# Patient Record
Sex: MALE | Race: BLACK OR AFRICAN AMERICAN | Employment: FULL TIME | URBAN - METROPOLITAN AREA
[De-identification: names, ages, dates, MRNs, and addresses within clinical notes are randomized per-mention and may not be internally consistent; named-entity substitution may affect disease eponyms.]

---

## 2020-10-28 ENCOUNTER — HOSPITAL ENCOUNTER (EMERGENCY)
Age: 45
Discharge: HOME | End: 2020-10-28
Attending: EMERGENCY MEDICINE
Payer: COMMERCIAL

## 2020-10-28 VITALS
SYSTOLIC BLOOD PRESSURE: 152 MMHG | HEART RATE: 62 BPM | WEIGHT: 217.59 LBS | HEIGHT: 71 IN | RESPIRATION RATE: 20 BRPM | BODY MASS INDEX: 30.46 KG/M2 | OXYGEN SATURATION: 100 % | TEMPERATURE: 98.2 F | DIASTOLIC BLOOD PRESSURE: 95 MMHG

## 2020-10-28 DIAGNOSIS — S61.012A LACERATION OF LEFT THUMB WITHOUT FOREIGN BODY WITHOUT DAMAGE TO NAIL, INITIAL ENCOUNTER: Primary | ICD-10-CM

## 2020-10-28 PROCEDURE — 90715 TDAP VACCINE 7 YRS/> IM: CPT | Performed by: PHYSICIAN ASSISTANT

## 2020-10-28 PROCEDURE — 74011000250 HC RX REV CODE- 250: Performed by: PHYSICIAN ASSISTANT

## 2020-10-28 PROCEDURE — 90471 IMMUNIZATION ADMIN: CPT

## 2020-10-28 PROCEDURE — 74011250636 HC RX REV CODE- 250/636: Performed by: PHYSICIAN ASSISTANT

## 2020-10-28 PROCEDURE — 99281 EMR DPT VST MAYX REQ PHY/QHP: CPT

## 2020-10-28 PROCEDURE — 75810000293 HC SIMP/SUPERF WND  RPR

## 2020-10-28 RX ORDER — LIDOCAINE HYDROCHLORIDE 10 MG/ML
5 INJECTION, SOLUTION EPIDURAL; INFILTRATION; INTRACAUDAL; PERINEURAL ONCE
Status: COMPLETED | OUTPATIENT
Start: 2020-10-28 | End: 2020-10-28

## 2020-10-28 RX ORDER — BUPIVACAINE HYDROCHLORIDE 5 MG/ML
5 INJECTION, SOLUTION EPIDURAL; INTRACAUDAL
Status: COMPLETED | OUTPATIENT
Start: 2020-10-28 | End: 2020-10-28

## 2020-10-28 RX ADMIN — BUPIVACAINE HYDROCHLORIDE 25 MG: 5 INJECTION, SOLUTION EPIDURAL; INTRACAUDAL at 20:04

## 2020-10-28 RX ADMIN — LIDOCAINE HYDROCHLORIDE 5 ML: 10 INJECTION, SOLUTION EPIDURAL; INFILTRATION; INTRACAUDAL; PERINEURAL at 20:04

## 2020-10-28 RX ADMIN — TETANUS TOXOID, REDUCED DIPHTHERIA TOXOID AND ACELLULAR PERTUSSIS VACCINE, ADSORBED 0.5 ML: 5; 2.5; 8; 8; 2.5 SUSPENSION INTRAMUSCULAR at 19:59

## 2020-10-28 NOTE — LETTER
[address] 
[facility] EMERGENCY DEPT 
[address] [place] [address] Joesph Carbajal [id number] 
[phone] Work/School Note Date: 10/28/2020 To Whom It May concern: 
 
Carley Landon was seen and treated today in the emergency room by the following provider(s): 
Attending Provider: Bette Sheldon MD 
Physician Assistant: Lucas Homans, PA.   
 
Carley Landon may return to work on 10/30/2020. Sincerely, Mendez Mcduffie PA-C

## 2020-10-28 NOTE — DISCHARGE INSTRUCTIONS
Patient Education        Cuts on the Hand Closed With Stitches: Care Instructions  Your Care Instructions     A cut on your hand can be on your fingers, your thumb, or the front or back of your hand. Sometimes a cut can injure the tendons, blood vessels, or nerves of your hand. The doctor used stitches to close the cut. Using stitches also helps the cut heal and reduces scarring. The doctor may have given you a splint to help prevent you from moving your hand, fingers, or thumb. If the cut went deep and through the skin, the doctor put in two layers of stitches. The deeper layer brings the deep part of the cut together. These stitches will dissolve and don't need to be removed. The stitches in the upper layer are the ones you see on the cut. You will probably have a bandage. You will need to have the stitches removed, usually in 7 to 14 days. The doctor may suggest that you see a hand specialist if the cut is very deep or if you have trouble moving your fingers or have less feeling in your hand. The doctor has checked you carefully, but problems can develop later. If you notice any problems or new symptoms, get medical treatment right away. Follow-up care is a key part of your treatment and safety. Be sure to make and go to all appointments, and call your doctor if you are having problems. It's also a good idea to know your test results and keep a list of the medicines you take. How can you care for yourself at home? · Keep the cut dry for the first 24 to 48 hours. After this, you can shower if your doctor okays it. Pat the cut dry. · Don't soak the cut, such as in a bathtub. Your doctor will tell you when it's safe to get the cut wet. · If your doctor told you how to care for your cut, follow your doctor's instructions. If you did not get instructions, follow this general advice:  ? After the first 24 to 48 hours, wash around the cut with clean water 2 times a day.  Don't use hydrogen peroxide or alcohol, which can slow healing. ? You may cover the cut with a thin layer of petroleum jelly, such as Vaseline, and a nonstick bandage. ? Apply more petroleum jelly and replace the bandage as needed. · Prop up the sore hand on a pillow anytime you sit or lie down during the next 3 days. Try to keep it above the level of your heart. This will help reduce swelling. · Avoid any activity that could cause your cut to reopen. · Do not remove the stitches on your own. Your doctor will tell you when to come back to have the stitches removed. · Be safe with medicines. Take pain medicines exactly as directed. ? If the doctor gave you a prescription medicine for pain, take it as prescribed. ? If you are not taking a prescription pain medicine, ask your doctor if you can take an over-the-counter medicine. When should you call for help? Call your doctor now or seek immediate medical care if:    · You have new pain, or your pain gets worse.     · The skin near the cut is cold or pale or changes color.     · You have tingling, weakness, or numbness near the cut.     · The cut starts to bleed, and blood soaks through the bandage. Oozing small amounts of blood is normal.     · You have trouble moving the area of the hand near the cut.     · You have symptoms of infection, such as:  ? Increased pain, swelling, warmth, or redness around the cut.  ? Red streaks leading from the cut.  ? Pus draining from the cut.  ? A fever. Watch closely for changes in your health, and be sure to contact your doctor if:    · You do not get better as expected. Where can you learn more? Go to http://www.gray.com/  Enter T250 in the search box to learn more about \"Cuts on the Hand Closed With Stitches: Care Instructions. \"  Current as of: June 26, 2019               Content Version: 12.6  © 4220-2972 [employer].    Care instructions adapted under license by [employer] (which disclaims liability or warranty for this information). If you have questions about a medical condition or this instruction, always ask your healthcare professional. [place] [address] any warranty or liability for your use of this information.

## 2020-10-28 NOTE — ED PROVIDER NOTES
EMERGENCY DEPARTMENT HISTORY AND PHYSICAL EXAM      Date: 10/28/2020  Patient Name: Bella Barth    History of Presenting Illness     Chief Complaint   Patient presents with    Laceration     left thumb       History Provided By: Patient    HPI: Bella Barth, 39 y.o. male with no pertinent past medical history, presents to the ED with cc of left thumb laceration onset 1 hour prior to arrival.  The patient was trying to break apart turkey burgers when he accidentally sliced his thumb. He sustained a 1.5 cm laceration over the side of the thumb. He reports heavy bleeding that has been improved with direct pressure to the area. He denies numbness. Tetanus is not up-to-date. There are no other complaints, changes, or physical findings at this time. PCP: Unknown, Provider    No current facility-administered medications on file prior to encounter. Current Outpatient Medications on File Prior to Encounter   Medication Sig Dispense Refill    [DISCONTINUED] HYDROcodone-acetaminophen (NORCO) 5-325 mg per tablet Take 1 Tab by mouth every four (4) hours as needed for Pain. Max Daily Amount: 6 Tabs. 10 Tab 0    [DISCONTINUED] cyclobenzaprine (FLEXERIL) 10 mg tablet Take 1 Tab by mouth three (3) times daily as needed for Muscle Spasm(s). 20 Tab 0       Past History     Past Medical History:  No past medical history on file. Past Surgical History:  No past surgical history on file. Family History:  No family history on file. Social History:  Social History     Tobacco Use    Smoking status: Never Smoker   Substance Use Topics    Alcohol use: No    Drug use: Not on file       Allergies:  No Known Allergies      Review of Systems   Review of Systems   Constitutional: Negative for chills and fever. HENT: Negative for ear pain and sore throat. Eyes: Negative for redness and visual disturbance. Respiratory: Negative for cough and shortness of breath.     Cardiovascular: Negative for chest pain and palpitations. Gastrointestinal: Negative for abdominal pain, nausea and vomiting. Genitourinary: Negative for dysuria and hematuria. Musculoskeletal: Negative for back pain and gait problem. Skin: Positive for wound. Negative for rash. Neurological: Negative for dizziness and headaches. Psychiatric/Behavioral: Negative for behavioral problems and confusion. All other systems reviewed and are negative. Physical Exam   Physical Exam  Constitutional:       Appearance: He is not toxic-appearing. Comments: Conversant male in no acute distress. HENT:      Head: Normocephalic and atraumatic. Mouth/Throat:      Mouth: Mucous membranes are moist.   Eyes:      Extraocular Movements: Extraocular movements intact. Pupils: Pupils are equal, round, and reactive to light. Neck:      Musculoskeletal: Normal range of motion and neck supple. Cardiovascular:      Rate and Rhythm: Normal rate and regular rhythm. Pulmonary:      Effort: Pulmonary effort is normal. No respiratory distress. Musculoskeletal: Normal range of motion. General: No deformity. Skin:     General: Skin is warm and dry. Comments: 1.5 cm linear laceration to the medial thumb. No evidence of tendon, bony, or nerve injury. Neurological:      General: No focal deficit present. Mental Status: He is alert and oriented to person, place, and time. Psychiatric:         Behavior: Behavior normal.             Diagnostic Study Results     Labs -   No results found for this or any previous visit (from the past 12 hour(s)). Radiologic Studies -   No orders to display     CT Results  (Last 48 hours)    None        CXR Results  (Last 48 hours)    None            Medical Decision Making   I am the first provider for this patient. I reviewed the vital signs, available nursing notes, past medical history, past surgical history, family history and social history.     Vital Signs-Reviewed the patient's vital signs. Patient Vitals for the past 12 hrs:   Temp Pulse Resp BP SpO2   10/28/20 1727 98.2 °F (36.8 °C) 62 20 (!) 152/95 100 %         Records Reviewed: Nursing Notes and Old Medical Records      Provider Notes (Medical Decision Making):   Patient presents with uncomplicated finger laceration. Plan for primary wound closure and tetanus update. ED Course:   Initial assessment performed. The patients presenting problems have been discussed, and they are in agreement with the care plan formulated and outlined with them. I have encouraged them to ask questions as they arise throughout their visit. Wound Repair    Date/Time: 10/28/2020 8:16 PM  Performed by: PAPreparation: skin prepped with ChloraPrep  Pre-procedure re-eval: Immediately prior to the procedure, the patient was reevaluated and found suitable for the planned procedure and any planned medications. Location details: left thumb  Wound length:2.5 cm or less  Anesthesia: digital block    Anesthesia:  Local Anesthetic: lidocaine 1% without epinephrine and bupivacaine 0.5% without epinephrine  Anesthetic total: 3 mL  Foreign bodies: no foreign bodies  Irrigation solution: saline  Irrigation method: syringe  Skin closure: 5-0 nylon  Number of sutures: 3  Technique: simple  Approximation: close  Dressing: gauze roll  Patient tolerance: Patient tolerated the procedure well with no immediate complications  My total time at bedside, performing this procedure was 16-30 minutes. Disposition:  8:16 PM    The patient has been re-evaluated and is ready for discharge. Reviewed available results with patient. Counseled patient on diagnosis and care plan. Patient has expressed understanding, and all questions have been answered. Patient agrees with plan and agrees to follow up as recommended, or to return to the ED if their symptoms worsen.  Discharge instructions have been provided and explained to the patient, along with reasons to return to the ED.      PLAN:  1. Current Discharge Medication List      STOP taking these medications       HYDROcodone-acetaminophen (NORCO) 5-325 mg per tablet Comments:   Reason for Stopping:         cyclobenzaprine (FLEXERIL) 10 mg tablet Comments:   Reason for Stoppin.   Follow-up Information     Follow up With Specialties Details Why Contact Info    [facility] EMERGENCY DEPT Emergency Medicine Go to in 10-14 days for stitch removal, sooner for signs of infection [address] [place] [address]  [phone]        Return to ED if worse     Diagnosis     Clinical Impression:   1. Laceration of left thumb without foreign body without damage to nail, initial encounter            Chavez Solo.  TARYN León

## 2020-10-29 NOTE — ED NOTES
I have reviewed discharge instructions with the patient. The patient verbalized understanding and signed.

## 2021-03-24 ENCOUNTER — HOSPITAL ENCOUNTER (EMERGENCY)
Age: 46
Discharge: HOME | End: 2021-03-25
Attending: EMERGENCY MEDICINE
Payer: COMMERCIAL

## 2021-03-24 VITALS
SYSTOLIC BLOOD PRESSURE: 131 MMHG | TEMPERATURE: 98.4 F | RESPIRATION RATE: 18 BRPM | OXYGEN SATURATION: 98 % | WEIGHT: 203.93 LBS | BODY MASS INDEX: 28.55 KG/M2 | HEART RATE: 68 BPM | DIASTOLIC BLOOD PRESSURE: 82 MMHG | HEIGHT: 71 IN

## 2021-03-24 DIAGNOSIS — M54.6 ACUTE RIGHT-SIDED THORACIC BACK PAIN: ICD-10-CM

## 2021-03-24 DIAGNOSIS — R07.89 MUSCULOSKELETAL CHEST PAIN: Primary | ICD-10-CM

## 2021-03-24 PROCEDURE — 99283 EMERGENCY DEPT VISIT LOW MDM: CPT

## 2021-03-25 LAB
ATRIAL RATE: 62 BPM
CALCULATED P AXIS, ECG09: 39 DEGREES
CALCULATED R AXIS, ECG10: 35 DEGREES
CALCULATED T AXIS, ECG11: 44 DEGREES
DIAGNOSIS, 93000: NORMAL
P-R INTERVAL, ECG05: 154 MS
Q-T INTERVAL, ECG07: 372 MS
QRS DURATION, ECG06: 88 MS
QTC CALCULATION (BEZET), ECG08: 377 MS
VENTRICULAR RATE, ECG03: 62 BPM

## 2021-03-25 PROCEDURE — 93005 ELECTROCARDIOGRAM TRACING: CPT

## 2021-03-25 RX ORDER — NAPROXEN 500 MG/1
500 TABLET ORAL
Qty: 20 TAB | Refills: 0 | Status: SHIPPED | OUTPATIENT
Start: 2021-03-25

## 2021-03-25 RX ORDER — NAPROXEN 250 MG/1
500 TABLET ORAL
Status: DISCONTINUED | OUTPATIENT
Start: 2021-03-25 | End: 2021-03-25 | Stop reason: HOSPADM

## 2021-03-25 NOTE — ED PROVIDER NOTES
EMERGENCY DEPARTMENT HISTORY AND PHYSICAL EXAM     ------------------------------------------------------------------------------------------------------  Please note that this dictation was completed with [employer] the [employer] voice recognition software. Quite often unanticipated grammatical, syntax, homophones, and other interpretive errors are inadvertently transcribed by the computer software. Please disregard these errors. Please excuse any errors that have escaped final proofreading.  -----------------------------------------------------------------------------------------------------------------    Date: 3/24/2021  Patient Name: Shannan Canales    History of Presenting Illness     Chief Complaint   Patient presents with    Muscle Problem     Pt arrives ambulatory to triage with CC of muscular pain around chest and R arm. He reports pain only occurs with movement of arms. Patient reports he works at [employer] at [employer]. History Provided By: Patient    HPI: Shannan Canales is a 39 y.o. male, without significant pmhx, who presents via private vehicle to the ED with c/o right anterior chest and right posterior subscapular pain that started earlier today. Patient reports that he works overnight at [employer] and lifting something heavier than he should have lifted on his own. Notes that his pain started earlier today with movement of his right arm exacerbating his sharp pain. Without other known injury or trauma. Did not take any medication at home to alleviate his symptoms. Pt also specifically denies any recent fevers, chills, CP, SOB, nausea, vomiting, diarrhea, abd pain, changes in BM, urinary sxs, or headache. PCP: Unknown, Provider    Social Hx: denies tobacco, denies EtOH, denies Illicit Drugs     There are no other complaints, changes, or physical findings at this time.      No Known Allergies          Past History     Past Medical History:  No past medical history on file. Past Surgical History:  No past surgical history on file. Family History:  No family history on file. Social History:  Social History     Tobacco Use    Smoking status: Never Smoker   Substance Use Topics    Alcohol use: No    Drug use: Not on file       Allergies:  No Known Allergies      Review of Systems   Review of Systems   Constitutional: Negative for chills and fever. HENT: Negative. Eyes: Negative. Respiratory: Negative for cough, chest tightness and shortness of breath. Cardiovascular: Negative for chest pain and leg swelling. Gastrointestinal: Negative for abdominal pain, diarrhea, nausea and vomiting. Endocrine: Negative. Genitourinary: Negative for difficulty urinating and dysuria. Musculoskeletal: Positive for myalgias. Skin: Negative. Neurological: Negative. Psychiatric/Behavioral: Negative. All other systems reviewed and are negative. Physical Exam   Physical Exam  Vitals signs and nursing note reviewed. Constitutional:       General: He is not in acute distress. Appearance: He is well-developed. He is not diaphoretic. HENT:      Head: Normocephalic and atraumatic. Nose: Nose normal.      Mouth/Throat:      Pharynx: No oropharyngeal exudate. Eyes:      Conjunctiva/sclera: Conjunctivae normal.      Pupils: Pupils are equal, round, and reactive to light. Neck:      Musculoskeletal: Normal range of motion and neck supple. Vascular: No JVD. Cardiovascular:      Rate and Rhythm: Normal rate and regular rhythm. Heart sounds: Normal heart sounds. No murmur. No friction rub. Pulmonary:      Effort: Pulmonary effort is normal. No respiratory distress. Breath sounds: Normal breath sounds. No stridor. No wheezing or rales. Abdominal:      General: Bowel sounds are normal. There is no distension. Palpations: Abdomen is soft. Tenderness: There is no abdominal tenderness. There is no rebound. Musculoskeletal: Normal range of motion. General: No tenderness. Right shoulder: He exhibits pain. He exhibits normal range of motion, no tenderness, no bony tenderness, no swelling, no deformity, no spasm, normal pulse and normal strength. Arms:    Skin:     General: Skin is warm and dry. Findings: No rash. Neurological:      Mental Status: He is alert and oriented to person, place, and time. Cranial Nerves: No cranial nerve deficit. Psychiatric:         Speech: Speech normal.         Behavior: Behavior normal.         Thought Content: Thought content normal.         Judgment: Judgment normal.           Diagnostic Study Results     Labs -     Recent Results (from the past 12 hour(s))   EKG, 12 LEAD, INITIAL    Collection Time: 03/25/21 12:24 AM   Result Value Ref Range    Ventricular Rate 62 BPM    Atrial Rate 62 BPM    P-R Interval 154 ms    QRS Duration 88 ms    Q-T Interval 372 ms    QTC Calculation (Bezet) 377 ms    Calculated P Axis 39 degrees    Calculated R Axis 35 degrees    Calculated T Axis 44 degrees    Diagnosis       Normal sinus rhythm  Normal ECG  No previous ECGs available         Radiologic Studies -   No orders to display     CT Results  (Last 48 hours)    None        CXR Results  (Last 48 hours)    None            Medical Decision Making   I am the first provider for this patient. I reviewed the vital signs, available nursing notes, past medical history, past surgical history, family history and social history. Vital Signs-Reviewed the patient's vital signs.   Patient Vitals for the past 12 hrs:   Temp Pulse Resp BP SpO2   03/24/21 2245 98.4 °F (36.9 °C) 68 18 131/82 98 %       Pulse Oximetry Analysis - 98% on RA normal    Records Reviewed/Interpretted: Nursing Notes from triage and Old Medical Records previous ER visit in October for laceration of his thumb    Provider Notes (Medical Decision Making):     DDX:  Musculoskeletal pain, arrhythmia, muscle spasm    Plan:  EKG, analgesic    Impression:  musculoskeletal pain    ED Course:   Initial assessment performed. The patients presenting problems have been discussed, and they are in agreement with the care plan formulated and outlined with them. I have encouraged them to ask questions as they arise throughout their visit. I reviewed our electronic medical record system for any past medical records that were available that may contribute to the patients current condition, the nursing notes and and vital signs from today's visit  Nursing notes will be reviewed as they become available in realtime while the pt has been in the ED. Saritha Mcfadden MD    12:42 AM  Progress note:  Pt noted to be feeling better, ready for discharge. Pt will follow up with primary care/this ER as instructed. All questions have been answered, pt voiced understanding and agreement with plan. Specific return precautions provided in addition to instructions for pt to return to the ED immediately should sx worsen at any time. Saritha Mcfadden MD             Critical Care Time:     none      Diagnosis     Clinical Impression:   1. Musculoskeletal chest pain    2. Acute right-sided thoracic back pain        PLAN:  1. Current Discharge Medication List      START taking these medications    Details   naproxen (NAPROSYN) 500 mg tablet Take 1 Tab by mouth every twelve (12) hours as needed for Pain. Qty: 20 Tab, Refills: 0           2. Follow-up Information     Follow up With Specialties Details Why Contact Info    [facility] EMERGENCY DEPT Emergency Medicine  As needed [address] [place] [address]  [facility] [facility]  [phone]        Return to ED if worse     Disposition:    12:43 AM  The patient's results have been reviewed with family and/or caregiver.  They verbally convey their understanding and agreement of the patient's signs, symptoms, diagnosis, treatment and prognosis and additionally agree to follow up as recommended in the discharge instructions or to return to the Emergency Room should the patient's condition change prior to their follow-up appointment. The family and/or caregiver verbally agrees with the care-plan and all of their questions have been answered. The discharge instructions have also been provided to the them with educational information regarding the patient's diagnosis as well a list of reasons why the patient would want to return to the ER prior to their follow-up appointment should their condition change.   Elsi Mehta MD

## 2021-03-25 NOTE — DISCHARGE INSTRUCTIONS
Local Primary Care Physicians     [facility] [phone]   Kathrynn Guiles, MD Lennox Belfast, MD     [facility] [phone]   Lisbet Miranda, P   Gerry Cordova, MD Katlyn Manuel MD [address] [phone]   MD Steve Brown MD    [address] [facility] [phone]   MD Carmen Duenas, MD Kathryn Chin, MD Oneal Beebe MD     [facility] [phone]   CXYM ODMJJUAN SOFIA, MD Jesus Alberto Quinones, MD Pipe Cedeno, NP     [address] [facility] [address] [phone]   Mu Shelton, MD Yovany Campos, MD J Luis Tovar, MD Lynda Mcallister, MD Indira Gambino, MD Markus Seay, MD Tea Galarza MD     07 85 [facility], MD    [facility] [phone]   MD Kapil Hein, NP   Joyce Germain, MD Latricia Duarte, MD Lisandra Saldivar MD     [address] [facility] [phone]   Judith Barron, MD Igor Viveros, P   Anupam Navarrete, NP   Kristin Hopkins, MD Terrell Stoll, MD Glenna Christopher, MD Juliane Rubio MD    [facility] [phone]   MD Prashant Concepcion, MD Raúl Pate MD Selina Poll, MD Melquiades Lopez MD     [address] [phone]   MD Gabo Wren MD [place] [phone]   MD Ramon Li, MD Tanisha Moulton MD     [facility] [phone]   Lori Hernandez, MD Toni Cevallos, MD Shirley Ledesma, MD Marimar Cortez, MD Cleveland Sue, NP   Deepali Eng MD     [address]  66 [phone]   MD Lashanda Hernández MD Darryl Lemmings, MD     2361 [facility] [phone]     MD Ania Bishop, TARYN Prakash FNP Jerelene Cote, PA-C Clemmie Fuller, MD Gleen Reap, AUDI Craft DO     Miscellaneous:     [facility] Departments    For adult and child immunizations, family planning, TB screening, STD testing and women's health services. [facility]: [place] [phone]     [address] 1822   [facility]   [address] [place] [address] St: [facility] [address] [phone]     [address] [place] [address]        [address] [address]    For primary care services, woman and child wellness, and some clinics providing specialty care. [facility] -- [facility]. [facility]5 [facility] [phone]  [address] [place] [address] 200 [facility] [address] [place] [address] [phone]  [address] [place] [address] [address] [place] [address] [phone]  [address] [place] [address] [facility] [address] [place] [address] 5850 [facility]  [phone]  [address] [place] [address] [address] [place] [phone]  [address] [phone]  [address] [phone]  [facility]: [facility] 700 [facility] [phone]    [address] [phone]  [facility] 5850  Community  [phone]  [facility]  [address], [address] [place] ([email or web address]) [phone]       Sexual Health/Woman Wellness Clinics   For STD/HIV testing and treatment, pregnancy testing and services, men's health, birth control services, LGBT services, and hepatitis/HPV vaccine services. [facility] [employer] [address] [facility] 1900 [facility] 300 [facility] [place] [facility] 600 NICOLA [place] [phone]  [address] [place] [address] Rd, 5th floor [phone]  [facility] 59 [facility] 118 [place] Matiasri [phone]       8260 [facility] 401 [facility],[address]   [phone]  [place]   [phone]  Women, Infant and Children's Services: [address] [phone]      [address] [phone]  [facility]   [phone]  [address] [place] [address]   [phone]  [facility]     [phone]  [address] Crisis   [address] [phone]    Thank you for allowing us to provide you with excellent care today. We hope we addressed all of your concerns and needs. We strive to provide excellent quality care in the Emergency Department. Please rate us as excellent, as anything less than excellent does not meet our expectations. If you feel that you have not received excellent quality care or timely care, please ask to speak to the nurse manager. Please choose us in the future for your continued health care needs. The exam and treatment you received in the Emergency Department were for an urgent problem and are not intended as complete care. It is important that you follow up with a doctor, nurse practitioner, or physician assistant for ongoing care. If your symptoms become worse or you do not improve as expected and you are unable to reach your usual health care provider, you should return to the Emergency Department. We are available 24 hours a day. Take this sheet with you when you go to your follow-up visit. If you have any problem arranging the follow-up visit, contact the Emergency Department immediately. If a prescription has been provided, please have it filled as soon as possible to avoid a delay in treatment. Read the entire medication instruction sheet provided to you by the pharmacy.  If you have any questions or reservations about taking the medication due to side effects or interactions with other medications please call the ER or your primary care physician. Take this sheet with you when you go to your follow-up visit. Make an appointment with your family doctor or the physician you were referred to for follow-up of this visit, as this is mandatory follow-up. Return to the ER if you are unable to be seen or if you are unable to be seen in a timely manner. If you have any problem arranging the follow-up visit, contact the Emergency Department immediately.

## 2022-08-03 ENCOUNTER — OFFICE VISIT (OUTPATIENT)
Dept: SLEEP MEDICINE | Age: 47
End: 2022-08-03
Payer: COMMERCIAL

## 2022-08-03 VITALS
SYSTOLIC BLOOD PRESSURE: 125 MMHG | TEMPERATURE: 98 F | HEART RATE: 68 BPM | BODY MASS INDEX: 30.69 KG/M2 | DIASTOLIC BLOOD PRESSURE: 79 MMHG | HEIGHT: 71 IN | OXYGEN SATURATION: 99 % | WEIGHT: 219.2 LBS

## 2022-08-03 DIAGNOSIS — G47.33 OBSTRUCTIVE SLEEP APNEA (ADULT) (PEDIATRIC): Primary | ICD-10-CM

## 2022-08-03 DIAGNOSIS — E66.9 OBESITY, CLASS I, BMI 30-34.9: ICD-10-CM

## 2022-08-03 PROCEDURE — 99204 OFFICE O/P NEW MOD 45 MIN: CPT | Performed by: INTERNAL MEDICINE

## 2022-08-03 NOTE — PATIENT INSTRUCTIONS
217 [facility]. [place], [address]  Tel.  [phone]  Fax. 7543 [facility], [address] [facility] [address]  Tel.  [phone]  Fax. [phone] [address]  Tel.  [phone]  Fax. [phone]     Sleep Apnea: After Your Visit  Your Care Instructions  Sleep apnea occurs when you frequently stop breathing for 10 seconds or longer during sleep. It can be mild to severe, based on the number of times per hour that you stop breathing or have slowed breathing. Blocked or narrowed airways in your nose, mouth, or throat can cause sleep apnea. Your airway can become blocked when your throat muscles and tongue relax during sleep. Sleep apnea is common, occurring in 1 out of 20 individuals. Individuals having any of the following characteristics should be evaluated and treated right away due to high risk and detrimental consequences from untreated sleep apnea:  Obesity  Congestive Heart failure  Atrial Fibrillation  Uncontrolled Hypertension  Type II Diabetes  Night-time Arrhythmias  Stroke  Pulmonary Hypertension  High-risk Driving Populations (pilots, truck drivers, etc.)  Patients Considering Weight-loss Surgery    How do you know you have sleep apnea? You probably have sleep apnea if you answer 'yes' to 3 or more of the following questions:  S - Have you been told that you Snore? T - Are you often Tired during the day? O - Has anyone Observed you stop breathing while sleeping? P- Do you have (or are being treated for) high blood Pressure? B - Are you obese (Body Mass Index > 35)? A - Is your Age 48years old or older? N - Is your Neck size greater than 16 inches? G - Are you male Gender? A sleep physician can prescribe a breathing device that prevents tissues in the throat from blocking your airway. Or your doctor may recommend using a dental device (oral breathing device) to help keep your airway open.  In some cases, surgery may be needed to remove enlarged tissues in the throat. Follow-up care is a key part of your treatment and safety. Be sure to make and go to all appointments, and call your doctor if you are having problems. It's also a good idea to know your test results and keep a list of the medicines you take. How can you care for yourself at home? Lose weight, if needed. It may reduce the number of times you stop breathing or have slowed breathing. Go to bed at the same time every night. Sleep on your side. It may stop mild apnea. If you tend to roll onto your back, sew a pocket in the back of your paDaylife top. Put a tennis ball into the pocket, and stitch the pocket shut. This will help keep you from sleeping on your back. Avoid alcohol and medicines such as sleeping pills and sedatives before bed. Do not smoke. Smoking can make sleep apnea worse. If you need help quitting, talk to your doctor about stop-smoking programs and medicines. These can increase your chances of quitting for good. Prop up the head of your bed 4 to 6 inches by putting bricks under the legs of the bed. Treat breathing problems, such as a stuffy nose, caused by a cold or allergies. Use a continuous positive airway pressure (CPAP) breathing machine if lifestyle changes do not help your apnea and your doctor recommends it. The machine keeps your airway from closing when you sleep. If CPAP does not help you, ask your doctor whether you should try other breathing machines. A bilevel positive airway pressure machine has two types of air pressureâone for breathing in and one for breathing out. Another device raises or lowers air pressure as needed while you breathe. If your nose feels dry or bleeds when using one of these machines, talk with your doctor about increasing moisture in the air. A humidifier may help.   If your nose is runny or stuffy from using a breathing machine, talk with your doctor about using decongestants or a corticosteroid nasal spray.  When should you call for help? Watch closely for changes in your health, and be sure to contact your doctor if:  You still have sleep apnea even though you have made lifestyle changes. You are thinking of trying a device such as CPAP. You are having problems using a CPAP or similar machine. Where can you learn more? Go to [employer]. Enter Z919 in the search box to learn more about \"Sleep Apnea: After Your Visit. \"   © 1700-0059 Healthwise, Incorporated. Care instructions adapted under license by [employer] (which disclaims liability or warranty for this information). This care instruction is for use with your licensed healthcare professional. If you have questions about a medical condition or this instruction, always ask your healthcare professional. Isabella Diazs any warranty or liability for your use of this information. PROPER SLEEP HYGIENE    What to avoid  Do not have drinks with caffeine, such as coffee or black tea, for 8 hours before bed. Do not smoke or use other types of tobacco near bedtime. Nicotine is a stimulant and can keep you awake. Avoid drinking alcohol late in the evening, because it can cause you to wake in the middle of the night. Do not eat a big meal close to bedtime. If you are hungry, eat a light snack. Do not drink a lot of water close to bedtime, because the need to urinate may wake you up during the night. Do not read or watch TV in bed. Use the bed only for sleeping and sexual activity. What to try  Go to bed at the same time every night, and wake up at the same time every morning. Do not take naps during the day. Keep your bedroom quiet, dark, and cool. Get regular exercise, but not within 3 to 4 hours of your bedtime. .  Sleep on a comfortable pillow and mattress. If watching the clock makes you anxious, turn it facing away from you so you cannot see the time.   If you worry when you lie down, start a worry book. Well before bedtime, write down your worries, and then set the book and your concerns aside. Try meditation or other relaxation techniques before you go to bed. If you cannot fall asleep, get up and go to another room until you feel sleepy. Do something relaxing. Repeat your bedtime routine before you go to bed again. Make your house quiet and calm about an hour before bedtime. Turn down the lights, turn off the TV, log off the computer, and turn down the volume on music. This can help you relax after a busy day. Drowsy Driving  The [address] [place] [address] Road Traffic Safety Administration cites drowsiness as a causing factor in more than 261,876 police reported crashes annually, resulting in 76,000 injuries and 1,500 deaths. Other surveys suggest 55% of people polled have driven while drowsy in the past year, 23% had fallen asleep but not crashed, 3% crashed, and 2% had and accident due to drowsy driving. Who is at risk? Young Drivers: One study of drowsy driving accidents states that 55% of the drivers were under 25 years. Of those, 75% were male. Shift Workers and Travelers: People who work overnight or travel across time zones frequently are at higher risk of experiencing Circadian Rhythm Disorders. They are trying to work and function when their body is programed to sleep. Sleep Deprived: Lack of sleep has a serious impact on your ability to pay attention or focus on a task. Consistently getting less than the average of 8 hours your body needs creates partial or cumulative sleep deprivation. Untreated Sleep Disorders: Sleep Apnea, Narcolepsy, R.L.S., and other sleep disorders (untreated) prevent a person from getting enough restful sleep. This leads to excessive daytime sleepiness and increases the risk for drowsy driving accidents by up to 7 times. Medications / Alcohol: Even over the counter medications can cause drowsiness.  Medications that impair a drivers attention should have a warning label. Alcohol naturally makes you sleepy and on its own can cause accidents. Combined with excessive drowsiness its effects are amplified. Signs of Drowsy Driving:   * You don't remember driving the last few miles   * You may drift out of your amilcar   * You are unable to focus and your thoughts wander   * You may yawn more often than normal   * You have difficulty keeping your eyes open / nodding off   * Missing traffic signs, speeding, or tailgating  Prevention-   Good sleep hygiene, lifestyle and behavioral choices have the most impact on drowsy driving. There is no substitute for sleep and the average person requires 8 hours nightly. If you find yourself driving drowsy, stop and sleep. Consider the sleep hygiene tips provided during your visit as well. Medication Refill Policy: Refills for all medications require 1 week advance notice. Please have your pharmacy fax a refill request. We are unable to fax, or call in \"controled substance\" medications and you will need to pick these prescriptions up from our office. 365looks Activation    Thank you for requesting access to 365looks. Please follow the instructions below to securely access and download your online medical record. 365looks allows you to send messages to your doctor, view your test results, renew your prescriptions, schedule appointments, and more. How Do I Sign Up? In your internet browser, go to [email or web address]. Click on the First Time User? Click Here link in the Sign In box. You will see the New Member Sign Up page. Enter your 365looks Access Code exactly as it appears below. You will not need to use this code after youve completed the sign-up process. If you do not sign up before the expiration date, you must request a new code. 365looks Access Code:  Activation code not generated  Current 365looks Status: Active (This is the date your 365looks access code will )    Enter the last four digits of your Social Security Number (xxxx) and Date of Birth (mm/dd/yyyy) as indicated and click Submit. You will be taken to the next sign-up page. Create a Alytics ID. This will be your Alytics login ID and cannot be changed, so think of one that is secure and easy to remember. Create a Alytics password. You can change your password at any time. Enter your Password Reset Question and Answer. This can be used at a later time if you forget your password. Enter your e-mail address. You will receive e-mail notification when new information is available in [address] Click Sign Up. You can now view and download portions of your medical record. Click the TwentyFeet link to download a portable copy of your medical information. Additional Information    If you have questions, please call [phone]. Remember, Alytics is NOT to be used for urgent needs. For medical emergencies, dial 911.

## 2022-08-03 NOTE — PROGRESS NOTES
[address] [place], [address]  Tel.  [phone]  Fax. 100 [facility] 60  [place], [address]  Tel.  [phone]  Fax. [phone] [address] [place] [place] [address]  Tel.  [phone]  Fax. [phone]         Subjective:      Sintia Lenz is an 41 y.o. male self-referred for evaluation for a sleep disorder. He complains of snoring, periods of not breathing associated with excessive daytime sleepiness. Symptoms began 20  years ago, gradually worsening since that time. He usually can fall asleep in 30 minutes. Family or house members note snoring, periods of not breathing. He denies falling asleep while at work, driving. Sintia Lenz does not wake up frequently at night. He is not bothered by waking up too early and left unable to get back to sleep. He actually sleeps about 3 hours at night and wakes up about 6 times during the night. He does work shifts:  First Shift. Waleska Vincent indicates he does get too little sleep at night. His bedtime is 2300. He awakens at 0530. He does not take naps. . He has the following observed behaviors: Loud snoring, Light snoring, Pauses in breathing, Kicking with legs, Twitching of legs or feet;  . 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 No Known Allergies      Current Outpatient Medications:     naproxen (NAPROSYN) 500 mg tablet, Take 1 Tab by mouth every twelve (12) hours as needed for Pain., Disp: 20 Tab, Rfl: 0     He  has no past medical history on file. No chronic illnesses    He  has no past surgical history on file. no surgeries    He family history includes Anxiety in his mother; Cancer in his father and mother. He  reports that he does not drink alcohol. Review of Systems:  Constitutional:  No significant weight loss or weight gain.   Eyes:  No blurred vision. CVS:  No significant chest pain  Pulm:  No significant shortness of breath  GI:  No significant nausea or vomiting  :  No significant nocturia  Musculoskeletal:  No significant joint pain at night  Skin:  No significant rashes  Neuro:  No significant dizziness   Psych:  some anxiety (mostly about work)    Sleep Review of Systems: notable for no difficulty falling asleep; infrequent awakenings at night;  occasional dreaming noted; no nightmares ; no early morning headaches; no memory problems; no concentration issues       Objective:   Visit Vitals  /79   Pulse 68   Temp 98 °F (36.7 °C)   Ht 5' 11\" (1.803 m)   Wt 219 lb 3.2 oz (99.4 kg)   SpO2 99%   BMI 30.57 kg/m²         General:   Not in acute distress   Eyes:  Anicteric sclerae, no obvious strabismus   Nose:  No obvious nasal septum deviation    Oropharynx:   Class 3 oropharyngeal outlet, thick tongue base, elongated uvula,  , narrow tonsilo-pharyngeal pilars   Tonsils:   tonsils are present and normal   Neck:   Neck circ. in \"inches\": 15; midline trachea   Chest/Lungs:  Equal lung expansion, clear on auscultation    CVS:  Normal rate, regular rhythm; no JVD   Skin:  Warm to touch; no obvious rashes   Neuro:  No focal deficits ; no obvious tremor    Psych:  Normal affect,  normal countenance;          Assessment:       ICD-10-CM ICD-9-CM    1. Obstructive sleep apnea (adult) (pediatric)  G47.33 327.23 SLEEP STUDY UNATTENDED, 4 CHANNEL      2. Obesity, Class I, BMI 30-34.9  E66.9 278.00             Plan:     * The patient currently has a Moderate Risk for having sleep apnea. STOP-BANG score 4.  * HSAT was ordered for initial evaluation. Treatment options for sleep apnea were reviewed. he would like to proceed with a trial of PAP if found to have significant apnea. * He was provided information on sleep apnea including coresponding risk factors and the importance of proper treatment.   * Counseling was provided regarding proper sleep hygiene and safe driving. 2. Obesity - I have counseled the patient regarding the benefits of weight loss. The treatment plan was reviewed with the patient in detail . he understands that the lead technologist will be calling him  with the results or notifying of results via [address] [place] [address] and assisting with the next step in the treatment plan as outlined today during the consultation with me. All of his questions were addressed.      Electronically signed by    Mayra Pederson MD  Diplomate in Sleep Medicine  [facility]  8/3/2022

## 2022-09-13 ENCOUNTER — HOSPITAL ENCOUNTER (OUTPATIENT)
Dept: SLEEP MEDICINE | Age: 47
Discharge: HOME | End: 2022-09-13
Payer: COMMERCIAL

## 2022-09-13 ENCOUNTER — OFFICE VISIT (OUTPATIENT)
Dept: SLEEP MEDICINE | Age: 47
End: 2022-09-13

## 2022-09-13 DIAGNOSIS — G47.33 OBSTRUCTIVE SLEEP APNEA (ADULT) (PEDIATRIC): Primary | ICD-10-CM

## 2022-09-13 PROCEDURE — 95806 SLEEP STUDY UNATT&RESP EFFT: CPT | Performed by: INTERNAL MEDICINE

## 2022-09-13 NOTE — PROGRESS NOTES
S>Derek Munoz is a 41 y.o. male seen today to receive a home sleep testing unit (HST). Patient was educated on proper hookup and operation of the HST via detailed instruction sheet (per COVID-19 precautions)  Instruction forms with after hours contact and documentation were signed. O>    There were no vitals taken for this visit. A>  No diagnosis found. P>  General information regarding operations and maintenance of the device was provided. Follow-up appointment was made to return the HST. He will be contacted once the results have been reviewed. He was asked to contact our office for any problems regarding his home sleep test study.

## 2022-09-16 ENCOUNTER — PATIENT MESSAGE (OUTPATIENT)
Dept: SLEEP MEDICINE | Age: 47
End: 2022-09-16

## 2022-09-16 ENCOUNTER — TELEPHONE (OUTPATIENT)
Dept: SLEEP MEDICINE | Age: 47
End: 2022-09-16

## 2022-09-16 DIAGNOSIS — G47.33 OBSTRUCTIVE SLEEP APNEA (ADULT) (PEDIATRIC): Primary | ICD-10-CM

## 2022-09-16 NOTE — TELEPHONE ENCOUNTER
Results of sleep study in [employer]  Lead tech to convey results to patient  HSAT results in [employer]. Test positive for significant sleep apnea. AHI 10/hour and lowest oxygen saturation was 89%. We had discussed treatment options at initial consultation. Based on the results of the home sleep apnea test, I believe a trial of APAP would be an effective mode of therapy. APAP order attached. he should be seen in the sleep disorder center 4-6 weeks after initiating PAP therapy. Patient should call the office the day he gets set up with new PAP device so we can schedule him for an adherence/compliance visit within 31-90 days of obtaining a new device. Front staff to Order PAP and call patient and let them know which DME company they should be hearing from after results reviewed with lead support technologist.     he will need a first adherence visit.

## 2022-09-16 NOTE — TELEPHONE ENCOUNTER
Results sent via [address] [place] [address] [address]    Fax DME order & Schedule 1st adherence visit in 60 to 90 days.

## 2022-09-19 ENCOUNTER — DOCUMENTATION ONLY (OUTPATIENT)
Dept: SLEEP MEDICINE | Age: 47
End: 2022-09-19

## 2022-09-19 NOTE — TELEPHONE ENCOUNTER
Faxed pap order to [facility] patient to inform, and call us back to schedule a F/U ( 1st Adherence) once patient gets device.

## 2023-05-17 RX ORDER — NAPROXEN 500 MG/1
500 TABLET ORAL
COMMUNITY
Start: 2021-03-25

## 2023-06-27 ENCOUNTER — HOSPITAL ENCOUNTER (EMERGENCY)
Facility: HOSPITAL | Age: 48
Discharge: HOME | End: 2023-06-28
Attending: EMERGENCY MEDICINE
Payer: COMMERCIAL

## 2023-06-27 DIAGNOSIS — K08.89 PAIN, DENTAL: Primary | ICD-10-CM

## 2023-06-27 PROCEDURE — 99283 EMERGENCY DEPT VISIT LOW MDM: CPT

## 2023-06-27 RX ORDER — OXYCODONE HYDROCHLORIDE AND ACETAMINOPHEN 5; 325 MG/1; MG/1
1 TABLET ORAL EVERY 6 HOURS PRN
Qty: 7 TABLET | Refills: 0 | Status: SHIPPED | OUTPATIENT
Start: 2023-06-27 | End: 2023-06-30

## 2023-06-27 RX ORDER — PENICILLIN V POTASSIUM 500 MG/1
500 TABLET ORAL 4 TIMES DAILY
Qty: 28 TABLET | Refills: 0 | Status: SHIPPED | OUTPATIENT
Start: 2023-06-27 | End: 2023-07-04

## 2023-06-27 RX ORDER — PENICILLIN V POTASSIUM 250 MG/1
500 TABLET ORAL
Status: COMPLETED | OUTPATIENT
Start: 2023-06-27 | End: 2023-06-28

## 2023-06-27 RX ORDER — HYDROCODONE BITARTRATE AND ACETAMINOPHEN 5; 325 MG/1; MG/1
1 TABLET ORAL
Status: COMPLETED | OUTPATIENT
Start: 2023-06-27 | End: 2023-06-28

## 2023-06-28 VITALS
SYSTOLIC BLOOD PRESSURE: 179 MMHG | HEIGHT: 71 IN | BODY MASS INDEX: 30.8 KG/M2 | RESPIRATION RATE: 14 BRPM | TEMPERATURE: 98 F | HEART RATE: 65 BPM | DIASTOLIC BLOOD PRESSURE: 113 MMHG | WEIGHT: 220 LBS | OXYGEN SATURATION: 100 %

## 2023-06-28 PROCEDURE — 6370000000 HC RX 637 (ALT 250 FOR IP): Performed by: EMERGENCY MEDICINE

## 2023-06-28 RX ADMIN — BENZOCAINE: 200 SPRAY DENTAL; ORAL; PERIODONTAL at 00:08

## 2023-06-28 RX ADMIN — HYDROCODONE BITARTRATE AND ACETAMINOPHEN 1 TABLET: 5; 325 TABLET ORAL at 00:08

## 2023-06-28 RX ADMIN — PENICILLIN V POTASSIUM 500 MG: 250 TABLET, FILM COATED ORAL at 00:08

## 2023-06-28 ASSESSMENT — PAIN SCALES - GENERAL: PAINLEVEL_OUTOF10: 10

## 2023-06-28 ASSESSMENT — LIFESTYLE VARIABLES
HOW OFTEN DO YOU HAVE A DRINK CONTAINING ALCOHOL: NEVER
HOW MANY STANDARD DRINKS CONTAINING ALCOHOL DO YOU HAVE ON A TYPICAL DAY: PATIENT DOES NOT DRINK